# Patient Record
Sex: MALE | Race: OTHER | Employment: FULL TIME | URBAN - METROPOLITAN AREA
[De-identification: names, ages, dates, MRNs, and addresses within clinical notes are randomized per-mention and may not be internally consistent; named-entity substitution may affect disease eponyms.]

---

## 2017-06-25 ENCOUNTER — HOSPITAL ENCOUNTER (EMERGENCY)
Facility: HOSPITAL | Age: 42
Discharge: HOME | End: 2017-06-25
Attending: EMERGENCY MEDICINE
Payer: COMMERCIAL

## 2017-06-25 VITALS
SYSTOLIC BLOOD PRESSURE: 134 MMHG | OXYGEN SATURATION: 100 % | RESPIRATION RATE: 18 BRPM | DIASTOLIC BLOOD PRESSURE: 81 MMHG | BODY MASS INDEX: 28.93 KG/M2 | HEART RATE: 67 BPM | HEIGHT: 66 IN | WEIGHT: 180 LBS | TEMPERATURE: 98 F

## 2017-06-25 DIAGNOSIS — T14.8XXA MUSCLE STRAIN: Primary | ICD-10-CM

## 2017-06-25 PROCEDURE — 99283 EMERGENCY DEPT VISIT LOW MDM: CPT

## 2017-06-25 RX ORDER — METHOCARBAMOL 750 MG/1
750 TABLET, FILM COATED ORAL 4 TIMES DAILY
Qty: 40 TABLET | Refills: 0 | Status: SHIPPED | OUTPATIENT
Start: 2017-06-25 | End: 2017-07-05
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
## 2017-06-29 ENCOUNTER — OFFICE VISIT (OUTPATIENT)
Dept: INTERNAL MEDICINE CLINIC | Facility: CLINIC | Age: 42
End: 2017-06-29

## 2017-06-29 VITALS
TEMPERATURE: 99 F | WEIGHT: 168.38 LBS | BODY MASS INDEX: 27 KG/M2 | DIASTOLIC BLOOD PRESSURE: 84 MMHG | HEART RATE: 72 BPM | SYSTOLIC BLOOD PRESSURE: 132 MMHG

## 2017-06-29 DIAGNOSIS — S13.9XXA CERVICAL SPRAIN, INITIAL ENCOUNTER: Primary | ICD-10-CM

## 2017-06-29 DIAGNOSIS — S43.402A SPRAIN OF LEFT SHOULDER, UNSPECIFIED SHOULDER SPRAIN TYPE, INITIAL ENCOUNTER: ICD-10-CM

## 2017-06-29 DIAGNOSIS — M54.6 ACUTE MIDLINE THORACIC BACK PAIN: ICD-10-CM

## 2017-06-29 PROCEDURE — 99212 OFFICE O/P EST SF 10 MIN: CPT | Performed by: INTERNAL MEDICINE

## 2017-06-29 PROCEDURE — 99214 OFFICE O/P EST MOD 30 MIN: CPT | Performed by: INTERNAL MEDICINE

## 2017-06-29 RX ORDER — MELOXICAM 15 MG/1
15 TABLET ORAL DAILY
Qty: 10 TABLET | Refills: 1 | Status: SHIPPED | OUTPATIENT
Start: 2017-06-29 | End: 2019-09-11 | Stop reason: ALTCHOICE
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
## 2017-11-10 ENCOUNTER — OFFICE VISIT (OUTPATIENT)
Dept: INTERNAL MEDICINE CLINIC | Facility: CLINIC | Age: 42
End: 2017-11-10

## 2017-11-10 VITALS
SYSTOLIC BLOOD PRESSURE: 110 MMHG | HEART RATE: 83 BPM | DIASTOLIC BLOOD PRESSURE: 72 MMHG | WEIGHT: 186 LBS | HEIGHT: 66 IN | BODY MASS INDEX: 29.89 KG/M2

## 2017-11-10 DIAGNOSIS — E78.5 HYPERLIPIDEMIA, UNSPECIFIED HYPERLIPIDEMIA TYPE: ICD-10-CM

## 2017-11-10 DIAGNOSIS — E03.8 TSH (THYROID-STIMULATING HORMONE DEFICIENCY): ICD-10-CM

## 2017-11-10 DIAGNOSIS — E55.9 VITAMIN D DEFICIENCY: ICD-10-CM

## 2017-11-10 DIAGNOSIS — Z00.00 ROUTINE GENERAL MEDICAL EXAMINATION AT A HEALTH CARE FACILITY: Primary | ICD-10-CM

## 2017-11-10 DIAGNOSIS — M54.2 NECK PAIN: ICD-10-CM

## 2017-11-10 PROCEDURE — 99396 PREV VISIT EST AGE 40-64: CPT | Performed by: INTERNAL MEDICINE

## 2017-11-10 RX ORDER — HYDROCODONE BITARTRATE AND ACETAMINOPHEN 5; 325 MG/1; MG/1
1 TABLET ORAL EVERY 4 HOURS PRN
COMMUNITY
Start: 2017-09-06 | End: 2019-09-11 | Stop reason: ALTCHOICE

## 2017-11-10 NOTE — PROGRESS NOTES
HPI:    Patient ID: Fawn Ariza is a 43year old male. HPI   Annual Preventative Exam  Patient  arrives today for annual preventative physical examination. The patient complains of no new problems and has 0/10 pain.          Review of Systems   Constituti rales. He exhibits no tenderness. Abdominal: Soft. Bowel sounds are normal. He exhibits no distension and no mass. There is no tenderness. There is no rebound and no guarding. Musculoskeletal: Normal range of motion.    Neurological: He is alert and holley Placed This Encounter      Vitamin D, 25-Hydroxy [E]      Lipid Panel [E]      TSH W REFLEX TO FREE T4 [06209][Q]    Meds This Visit:    No prescriptions requested or ordered in this encounter       Imaging & Referrals:  None       ID#[id number]  By signing my n

## 2017-12-15 ENCOUNTER — OFFICE VISIT (OUTPATIENT)
Dept: NEUROLOGY | Facility: CLINIC | Age: 42
End: 2017-12-15

## 2017-12-15 VITALS
DIASTOLIC BLOOD PRESSURE: 66 MMHG | SYSTOLIC BLOOD PRESSURE: 108 MMHG | BODY MASS INDEX: 29.73 KG/M2 | HEIGHT: 66 IN | OXYGEN SATURATION: 16 % | HEART RATE: 60 BPM | WEIGHT: 185 LBS

## 2017-12-15 DIAGNOSIS — K14.6 BURNING MOUTH SYNDROME: Primary | ICD-10-CM

## 2017-12-15 PROCEDURE — 99203 OFFICE O/P NEW LOW 30 MIN: CPT | Performed by: OTHER

## 2017-12-15 RX ORDER — GABAPENTIN 100 MG/1
CAPSULE ORAL
Qty: 270 CAPSULE | Refills: 1 | Status: SHIPPED | OUTPATIENT
Start: 2017-12-15 | End: 2018-02-16

## 2017-12-15 NOTE — PROGRESS NOTES
Neurology Initial Visit     Referred By: Dr. Alarcon ref. provider found    Chief Complaint: Patient presents with:  Neurologic Problem: Patient presents today c/o burning pain on bottom gums which started aftger he had oral surgery on 9/6.  He states that he tablet by mouth every 4 (four) hours as needed. , Disp: , Rfl:   •  Meloxicam 15 MG Oral Tab, Take 1 tablet (15 mg total) by mouth daily.  (Patient taking differently: Take 15 mg by mouth as needed.  ), Disp: 10 tablet, Rfl: 1    No Known Allergies    ROS: intact  Rapid alternating movements intact    Gait:  Normal posture  Normal physiologic      Labs:      Lab Results  Component Value Date   TSH 2.68 08/13/2016       Lab Results  Component Value Date   HDL 30 08/13/2016    (H) 08/13/2016   TRIG 108

## 2017-12-20 ENCOUNTER — APPOINTMENT (OUTPATIENT)
Dept: LAB | Age: 42
End: 2017-12-20
Attending: Other
Payer: COMMERCIAL

## 2017-12-20 DIAGNOSIS — E55.9 VITAMIN D DEFICIENCY: ICD-10-CM

## 2017-12-20 DIAGNOSIS — E78.5 HYPERLIPIDEMIA, UNSPECIFIED HYPERLIPIDEMIA TYPE: ICD-10-CM

## 2017-12-20 DIAGNOSIS — K14.6 BURNING MOUTH SYNDROME: ICD-10-CM

## 2017-12-20 PROCEDURE — 82607 VITAMIN B-12: CPT | Performed by: INTERNAL MEDICINE

## 2017-12-20 PROCEDURE — 82306 VITAMIN D 25 HYDROXY: CPT

## 2017-12-20 PROCEDURE — 82746 ASSAY OF FOLIC ACID SERUM: CPT

## 2017-12-20 PROCEDURE — 84443 ASSAY THYROID STIM HORMONE: CPT | Performed by: INTERNAL MEDICINE

## 2017-12-20 PROCEDURE — 82525 ASSAY OF COPPER: CPT

## 2017-12-20 PROCEDURE — 84630 ASSAY OF ZINC: CPT

## 2017-12-20 PROCEDURE — 86038 ANTINUCLEAR ANTIBODIES: CPT

## 2017-12-20 PROCEDURE — 36415 COLL VENOUS BLD VENIPUNCTURE: CPT

## 2017-12-20 PROCEDURE — 80061 LIPID PANEL: CPT

## 2017-12-20 PROCEDURE — 82390 ASSAY OF CERULOPLASMIN: CPT

## 2017-12-22 ENCOUNTER — TELEPHONE (OUTPATIENT)
Dept: NEUROLOGY | Facility: CLINIC | Age: 42
End: 2017-12-22

## 2017-12-22 NOTE — TELEPHONE ENCOUNTER
----- Message from Ajit Christina MD sent at 12/22/2017  1:11 PM CST -----  Please let the patient know that results of the tests so far were normal.    Thank you    Message left on patient's home and cell phones, asking him to call back.  We need to

## 2017-12-22 NOTE — TELEPHONE ENCOUNTER
Patient was informed of Dr. Mamta Wall message (from 12/22/17) that the results of the tests so far were normal. Patient voiced understanding, and had no questions at this time.

## 2018-02-16 NOTE — TELEPHONE ENCOUNTER
Spoke to patient. He states that he is taking gabapentin 100 mg, 3 po TID but insurance will only cover 6 caps per day. He is requesting a refill with gabapentin 300 mg capsules.      Refill request for gabapentin 300 mg, TID, #90, 1 refills

## 2018-02-17 RX ORDER — GABAPENTIN 300 MG/1
300 CAPSULE ORAL 3 TIMES DAILY
Qty: 90 CAPSULE | Refills: 1 | Status: SHIPPED | OUTPATIENT
Start: 2018-02-17 | End: 2019-09-11 | Stop reason: ALTCHOICE

## 2019-04-12 ENCOUNTER — TELEPHONE (OUTPATIENT)
Dept: OPHTHALMOLOGY | Facility: CLINIC | Age: 44
End: 2019-04-12

## 2019-04-12 NOTE — TELEPHONE ENCOUNTER
Patient complains of a chalazion on his LLL x 3-4 months which is gradually getting worse. He has tried W/C, but no improvement.   He has history of a chalazion excision with [facility] in the past.  I booked him with [facility] on 4/13/19 for evaluation only of chalazio

## 2019-04-13 ENCOUNTER — OFFICE VISIT (OUTPATIENT)
Dept: OPHTHALMOLOGY | Facility: CLINIC | Age: 44
End: 2019-04-13
Payer: COMMERCIAL

## 2019-04-13 DIAGNOSIS — H02.889 MGD (MEIBOMIAN GLAND DYSFUNCTION): Primary | ICD-10-CM

## 2019-04-13 DIAGNOSIS — H00.15 CHALAZION OF LEFT LOWER EYELID: ICD-10-CM

## 2019-04-13 PROCEDURE — 99203 OFFICE O/P NEW LOW 30 MIN: CPT | Performed by: OPHTHALMOLOGY

## 2019-04-13 PROCEDURE — 99212 OFFICE O/P EST SF 10 MIN: CPT | Performed by: OPHTHALMOLOGY

## 2019-04-13 NOTE — ASSESSMENT & PLAN NOTE
Told patient that since this has been there for some time, it most likely will not resolve on its own. Excision scheduled for patient to come back.

## 2019-04-13 NOTE — PATIENT INSTRUCTIONS
MGD (meibomian gland dysfunction)  Patient was instructed to use warm compresses to the eyelids twice a day everyday. Instructions for warm compress use:   Patient should place wash compresses on both eyelids for 5 minutes every morning and every night.

## 2019-04-13 NOTE — PROGRESS NOTES
Lady Cheung is a 37year old male. HPI:     HPI     Patient is here for an office visit. He is complaining of a bump on his LLL for 3-4 months. Patient states he is using warm compresses once a day and sometimes every other day.       Last edited by [place] [employer]                 ASSESSMENT/PLAN:     Diagnoses and Plan:     MGD (meibomian gland dysfunction)  Patient was instructed to use warm compresses to the eyelids twice a day everyday.     Instructions for warm compress use:   Patient should place wash co

## 2019-04-18 ENCOUNTER — OFFICE VISIT (OUTPATIENT)
Dept: OPHTHALMOLOGY | Facility: CLINIC | Age: 44
End: 2019-04-18
Payer: COMMERCIAL

## 2019-04-18 DIAGNOSIS — H11.222 PYOGENIC GRANULOMA OF LEFT CONJUNCTIVA: Primary | ICD-10-CM

## 2019-04-18 PROBLEM — H00.15 CHALAZION OF LEFT LOWER EYELID: Status: RESOLVED | Noted: 2019-04-13 | Resolved: 2019-04-18

## 2019-04-18 PROCEDURE — 68110 EXC LES CONJUNCTIVA <1 CM: CPT | Performed by: OPHTHALMOLOGY

## 2019-04-18 NOTE — PATIENT INSTRUCTIONS
Pyogenic granuloma of left conjunctiva  Excision of left pyogenic granuloma was done in the office today by Dr. Sandra Mackenzie without complications. Erythromycin was applied and pressure patch was applied to the left eye.    Patient can remove the pressure patch

## 2019-06-11 ENCOUNTER — TELEPHONE (OUTPATIENT)
Dept: NEUROLOGY | Facility: CLINIC | Age: 44
End: 2019-06-11

## 2019-09-11 ENCOUNTER — OFFICE VISIT (OUTPATIENT)
Dept: INTERNAL MEDICINE CLINIC | Facility: CLINIC | Age: 44
End: 2019-09-11
Payer: COMMERCIAL

## 2019-09-11 VITALS
TEMPERATURE: 99 F | SYSTOLIC BLOOD PRESSURE: 126 MMHG | DIASTOLIC BLOOD PRESSURE: 72 MMHG | HEIGHT: 66 IN | HEART RATE: 68 BPM | BODY MASS INDEX: 30.86 KG/M2 | RESPIRATION RATE: 20 BRPM | WEIGHT: 192 LBS

## 2019-09-11 DIAGNOSIS — Z28.21 INFLUENZA VACCINATION DECLINED: ICD-10-CM

## 2019-09-11 DIAGNOSIS — Z00.00 ANNUAL PHYSICAL EXAM: Primary | ICD-10-CM

## 2019-09-11 DIAGNOSIS — E55.9 VITAMIN D DEFICIENCY: ICD-10-CM

## 2019-09-11 DIAGNOSIS — R06.83 SNORING: ICD-10-CM

## 2019-09-11 DIAGNOSIS — Z28.21 TETANUS, DIPHTHERIA, AND ACELLULAR PERTUSSIS (TDAP) VACCINATION DECLINED: ICD-10-CM

## 2019-09-11 PROCEDURE — 99396 PREV VISIT EST AGE 40-64: CPT | Performed by: INTERNAL MEDICINE

## 2019-09-11 NOTE — PROGRESS NOTES
HPI:    Patient ID: Prince Olivarez is a 40year old male. Chief Complaint: Physical (Annual)      HPI  Pleasant 43y old gentleman who presents for annual physical exam.   Prior physician care:  Complaints: none today   Work in office setting.    Exercise inter Psychiatric/Behavioral: Negative for sleep disturbance. Medical History:     Reviewed:  No current outpatient medications on file.      Reviewed:  Patient Active Problem List    Influenza vaccination declined         Date Noted: 09/12/2019      Dayanara GLOBULIN 2.6 08/13/2016    AGRATIO 1.6 08/13/2016     08/13/2016    K 4.4 08/13/2016     08/13/2016    CO2 27 08/13/2016     Lab Results   Component Value Date    WBC 5.7 08/13/2016    RBC 5.14 08/13/2016    HGB 15.0 08/13/2016    HCT 44.3 08/ Eyes: Pupils are equal, round, and reactive to light. Conjunctivae and EOM are normal. Right eye exhibits no discharge. Left eye exhibits no discharge. No scleral icterus. Neck: Normal range of motion. Neck supple.    Cardiovascular: Normal rate, regular - Eat a low fat, low cholesterol diet- fish, poultry, legumes, eggs, baked chicken breast, turkey, nuts, whole grains, low fat live culture yogurt.   - Limit intake of fried/fast foods, red meats, high sodium canned/microwaved foods, sweets/sugary drinks, u Hemoglobin A1C      Comp Metabolic Panel (14)      Lipid Panel      INFLUENZA VACCINE, TRI, 0.5 ML      TETANUS, DIPHTHERIA TOXOIDS AND ACELLULAR PERTUSIS VACCINE (TDAP), >7 YEARS, IM USE        Fabio Dobson MD  Internal Medicine      ---------------- Exercise can help you lose weight, tone your muscles, and make your lungs work better. These changes may help improve your snoring. Ask your healthcare provider about an exercise program like walking, or something else that you enjoy.   Unblock your nose  I

## 2019-09-11 NOTE — PATIENT INSTRUCTIONS
Tips to Help Prevent Snoring    Your snoring may get better if you make a few simple changes in your sleeping and waking habits.  These changes might be all you need to improve or even cure your snoring, or they may work best when used along with other ty If something blocks your nose, treating the problem may help improve snoring. Your healthcare provider can suggest medicines for allergies or sinus problems. Nasal saline rinses can also open up the nasal passages.  Nasal strips applied on the bridge of the

## 2019-09-12 PROBLEM — Z28.21 INFLUENZA VACCINATION DECLINED: Status: ACTIVE | Noted: 2019-09-12

## 2019-09-12 PROBLEM — Z28.21 TETANUS, DIPHTHERIA, AND ACELLULAR PERTUSSIS (TDAP) VACCINATION DECLINED: Status: ACTIVE | Noted: 2019-09-12

## 2019-09-13 ENCOUNTER — APPOINTMENT (OUTPATIENT)
Dept: LAB | Age: 44
End: 2019-09-13
Attending: INTERNAL MEDICINE
Payer: COMMERCIAL

## 2019-09-13 LAB
25(OH)D3 SERPL-MCNC: 70.7 NG/ML (ref 30–100)
ALBUMIN SERPL-MCNC: 4.2 G/DL (ref 3.4–5)
ALBUMIN/GLOB SERPL: 1.2 {RATIO} (ref 1–2)
ALP LIVER SERPL-CCNC: 68 U/L (ref 45–117)
ALT SERPL-CCNC: 40 U/L (ref 16–61)
ANION GAP SERPL CALC-SCNC: 10 MMOL/L (ref 0–18)
AST SERPL-CCNC: 20 U/L (ref 15–37)
BILIRUB SERPL-MCNC: 0.8 MG/DL (ref 0.1–2)
BUN BLD-MCNC: 16 MG/DL (ref 7–18)
BUN/CREAT SERPL: 14.2 (ref 10–20)
CALCIUM BLD-MCNC: 9.4 MG/DL (ref 8.5–10.1)
CHLORIDE SERPL-SCNC: 107 MMOL/L (ref 98–112)
CHOLEST SMN-MCNC: 237 MG/DL (ref ?–200)
CO2 SERPL-SCNC: 25 MMOL/L (ref 21–32)
CREAT BLD-MCNC: 1.13 MG/DL (ref 0.7–1.3)
DEPRECATED RDW RBC AUTO: 37.8 FL (ref 35.1–46.3)
ERYTHROCYTE [DISTWIDTH] IN BLOOD BY AUTOMATED COUNT: 12.1 % (ref 11–15)
EST. AVERAGE GLUCOSE BLD GHB EST-MCNC: 111 MG/DL (ref 68–126)
GLOBULIN PLAS-MCNC: 3.5 G/DL (ref 2.8–4.4)
GLUCOSE BLD-MCNC: 97 MG/DL (ref 70–99)
HBA1C MFR BLD HPLC: 5.5 % (ref ?–5.7)
HCT VFR BLD AUTO: 45.3 % (ref 39–53)
HDLC SERPL-MCNC: 39 MG/DL (ref 40–59)
HGB BLD-MCNC: 14.9 G/DL (ref 13–17.5)
LDLC SERPL CALC-MCNC: 175 MG/DL (ref ?–100)
M PROTEIN MFR SERPL ELPH: 7.7 G/DL (ref 6.4–8.2)
MCH RBC QN AUTO: 28.2 PG (ref 26–34)
MCHC RBC AUTO-ENTMCNC: 32.9 G/DL (ref 31–37)
MCV RBC AUTO: 85.6 FL (ref 80–100)
NONHDLC SERPL-MCNC: 198 MG/DL (ref ?–130)
OSMOLALITY SERPL CALC.SUM OF ELEC: 295 MOSM/KG (ref 275–295)
PATIENT FASTING: YES
PATIENT FASTING: YES
PLATELET # BLD AUTO: 168 10(3)UL (ref 150–450)
POTASSIUM SERPL-SCNC: 3.9 MMOL/L (ref 3.5–5.1)
RBC # BLD AUTO: 5.29 X10(6)UL (ref 4.3–5.7)
SODIUM SERPL-SCNC: 142 MMOL/L (ref 136–145)
T4 FREE SERPL-MCNC: 1.1 NG/DL (ref 0.8–1.7)
TRIGL SERPL-MCNC: 116 MG/DL (ref 30–149)
TSI SER-ACNC: 3.76 MIU/ML (ref 0.36–3.74)
VLDLC SERPL CALC-MCNC: 23 MG/DL (ref 0–30)
WBC # BLD AUTO: 7.6 X10(3) UL (ref 4–11)

## 2019-09-13 PROCEDURE — 84439 ASSAY OF FREE THYROXINE: CPT | Performed by: INTERNAL MEDICINE

## 2019-09-13 PROCEDURE — 83036 HEMOGLOBIN GLYCOSYLATED A1C: CPT | Performed by: INTERNAL MEDICINE

## 2019-09-13 PROCEDURE — 84443 ASSAY THYROID STIM HORMONE: CPT | Performed by: INTERNAL MEDICINE

## 2019-09-13 PROCEDURE — 82306 VITAMIN D 25 HYDROXY: CPT | Performed by: INTERNAL MEDICINE

## 2019-09-13 PROCEDURE — 80053 COMPREHEN METABOLIC PANEL: CPT | Performed by: INTERNAL MEDICINE

## 2019-09-13 PROCEDURE — 80061 LIPID PANEL: CPT | Performed by: INTERNAL MEDICINE

## 2019-09-13 PROCEDURE — 85027 COMPLETE CBC AUTOMATED: CPT | Performed by: INTERNAL MEDICINE

## 2019-09-13 PROCEDURE — 36415 COLL VENOUS BLD VENIPUNCTURE: CPT | Performed by: INTERNAL MEDICINE

## 2019-09-16 DIAGNOSIS — E78.5 DYSLIPIDEMIA: Primary | ICD-10-CM

## 2019-12-03 ENCOUNTER — OFFICE VISIT (OUTPATIENT)
Dept: NEUROLOGY | Facility: CLINIC | Age: 44
End: 2019-12-03
Payer: COMMERCIAL

## 2019-12-03 VITALS
SYSTOLIC BLOOD PRESSURE: 100 MMHG | HEIGHT: 66 IN | RESPIRATION RATE: 20 BRPM | DIASTOLIC BLOOD PRESSURE: 70 MMHG | WEIGHT: 185 LBS | BODY MASS INDEX: 29.73 KG/M2 | HEART RATE: 56 BPM

## 2019-12-03 DIAGNOSIS — M54.12 CERVICAL RADICULOPATHY: Primary | ICD-10-CM

## 2019-12-03 PROCEDURE — 99214 OFFICE O/P EST MOD 30 MIN: CPT | Performed by: OTHER

## 2019-12-03 RX ORDER — GABAPENTIN 300 MG/1
CAPSULE ORAL
Qty: 270 CAPSULE | Refills: 3 | Status: SHIPPED | OUTPATIENT
Start: 2019-12-03

## 2019-12-03 NOTE — PROGRESS NOTES
Neurology follow up Visit     Referred By: Dr. Alarcon ref. provider found    Chief Complaint: Patient presents with:  Neck Pain: Patient presents for neck pain c/o tingling in fingers and weakness in left arm.  Patient states that he has had neck problem sinc back to our clinic in December 2019. He remembers that gabapentin is the prescription for the his gum burning actually was helpful for his neck pain back in 2017 but he has not been taking it recently.      Past Medical History:   Diagnosis Date   • Tan papilledema or retinal hemorrhages. Normal optic discs, sharp edges. III, IV, VI- EOM intact, WATSON  V. Facial sensation intact  VII. Face symmetric, no facial weakness  VIII. Hearing intact to whisper, tuning fork or finger rub. IX.  Pallet elevates symm or EMG as well and maybe even referral to physiatry       Education and counseling provided to patient. Instructed patient to call my office or seek medical attention immediately if symptoms worsen. Patient verbalized understanding of information given.  A

## 2020-05-31 DIAGNOSIS — E78.5 DYSLIPIDEMIA: ICD-10-CM

## 2020-06-01 RX ORDER — ROSUVASTATIN CALCIUM 5 MG/1
TABLET, COATED ORAL
Qty: 90 TABLET | Refills: 1 | Status: SHIPPED | OUTPATIENT
Start: 2020-06-01 | End: 2020-08-06

## 2020-08-06 ENCOUNTER — NURSE TRIAGE (OUTPATIENT)
Dept: INTERNAL MEDICINE CLINIC | Facility: CLINIC | Age: 45
End: 2020-08-06

## 2020-08-06 ENCOUNTER — OFFICE VISIT (OUTPATIENT)
Dept: FAMILY MEDICINE CLINIC | Facility: CLINIC | Age: 45
End: 2020-08-06
Payer: COMMERCIAL

## 2020-08-06 VITALS
DIASTOLIC BLOOD PRESSURE: 74 MMHG | BODY MASS INDEX: 29.09 KG/M2 | SYSTOLIC BLOOD PRESSURE: 119 MMHG | HEART RATE: 74 BPM | WEIGHT: 181 LBS | HEIGHT: 66 IN

## 2020-08-06 DIAGNOSIS — M54.42 CHRONIC MIDLINE LOW BACK PAIN WITH LEFT-SIDED SCIATICA: Primary | ICD-10-CM

## 2020-08-06 DIAGNOSIS — G89.29 CHRONIC MIDLINE LOW BACK PAIN WITH LEFT-SIDED SCIATICA: Primary | ICD-10-CM

## 2020-08-06 PROCEDURE — 96372 THER/PROPH/DIAG INJ SC/IM: CPT | Performed by: FAMILY MEDICINE

## 2020-08-06 PROCEDURE — 99203 OFFICE O/P NEW LOW 30 MIN: CPT | Performed by: FAMILY MEDICINE

## 2020-08-06 PROCEDURE — 3078F DIAST BP <80 MM HG: CPT | Performed by: FAMILY MEDICINE

## 2020-08-06 PROCEDURE — 3008F BODY MASS INDEX DOCD: CPT | Performed by: FAMILY MEDICINE

## 2020-08-06 PROCEDURE — 3074F SYST BP LT 130 MM HG: CPT | Performed by: FAMILY MEDICINE

## 2020-08-06 RX ORDER — METHYLPREDNISOLONE 4 MG/1
TABLET ORAL
Qty: 1 KIT | Refills: 0 | Status: SHIPPED | OUTPATIENT
Start: 2020-08-06

## 2020-08-06 RX ORDER — KETOROLAC TROMETHAMINE 30 MG/ML
60 INJECTION, SOLUTION INTRAMUSCULAR; INTRAVENOUS ONCE
Status: DISCONTINUED | OUTPATIENT
Start: 2020-08-06 | End: 2020-08-06

## 2020-08-06 RX ORDER — KETOROLAC TROMETHAMINE 30 MG/ML
30 INJECTION, SOLUTION INTRAMUSCULAR; INTRAVENOUS ONCE
Status: COMPLETED | OUTPATIENT
Start: 2020-08-06 | End: 2020-08-06

## 2020-08-06 RX ADMIN — KETOROLAC TROMETHAMINE 60 MG: 30 INJECTION, SOLUTION INTRAMUSCULAR; INTRAVENOUS at 14:53:00

## 2020-08-06 NOTE — PROGRESS NOTES
CHIEF COMPLAINT:     Patient presents with:  Back Pain: chronic back pain. HPI:   Carolina Wong is a 39year old male who is here for complaints of back pain. Pain is located at low back. Pain is described as sharp, shooting. Severity is severe.  The BACK: lumbosacral tenderness, DTR's are 2+ bilaterally, strength is 5+/5, sensation is intact, strength is decreased on the RLE, sensation is decreased on the RLE  NEURO:  DTR's intact and equal bilaterally. Sensation intact.     ASSESSMENT:   Jennifer Grubbs i · Sciatic nerve. This is a large nerve formed from several nerve roots in the low back. This nerve extends down the back of the leg to the foot. With lumbar radiculopathy, nerve roots in the low back become irritated. This leads to pain and symptoms.  The · Weight-loss program. If you are overweight, losing extra pounds (kilograms) may help relieve symptoms. In some cases, you may need surgery to fix the underlying problem. This depends on the cause, the symptoms, and how long the pain has lasted.   Possibl

## 2020-08-06 NOTE — TELEPHONE ENCOUNTER
Action Requested: Summary for Provider     []  Critical Lab, Recommendations Needed  [] Need Additional Advice  [x]   FYI    []   Need Orders  [] Need Medications Sent to Pharmacy  []  Other     SUMMARY: Patient calling with complaint of bilateral lower ba

## 2020-08-06 NOTE — PATIENT INSTRUCTIONS
Understanding Lumbar Radiculopathy    Lumbar radiculopathy is irritation or inflammation of a nerve root in the low back. It causes symptoms that spread out from the back down one or both legs.  To understand this condition, it helps to understand the par Symptoms of lumbar radiculopathy  These include:  · Pain in the low back  · Pain, numbness, tingling, or weakness that travels into the buttocks, hip, groin, or leg  · Muscle spasms in the low back, or leg  Treatment for lumbar radiculopathy  In most cases

## 2021-02-12 ENCOUNTER — NURSE TRIAGE (OUTPATIENT)
Dept: INTERNAL MEDICINE CLINIC | Facility: CLINIC | Age: 46
End: 2021-02-12

## 2021-02-12 ENCOUNTER — TELEPHONE (OUTPATIENT)
Dept: INTERNAL MEDICINE CLINIC | Facility: CLINIC | Age: 46
End: 2021-02-12

## 2021-02-12 NOTE — TELEPHONE ENCOUNTER
Action Requested: Summary for Provider     []  Critical Lab, Recommendations Needed  [x] Need Additional Advice  []   FYI    []   Need Orders  [] Need Medications Sent to Pharmacy  []  Other     SUMMARY: Swollen scrotum for two weeks, painful urination; ne

## 2021-02-12 NOTE — TELEPHONE ENCOUNTER
Called the patient, informed him of Dr. Mason Gains recommendation to be [facility] for [facility] treatment and evaluation. Patient stated he feels fine. Provided patient with closest location of IC([place]).

## 2021-02-12 NOTE — TELEPHONE ENCOUNTER
I recommend patient to be seen sooner by the Doctor    [facility] be seen in [facility] today  for further treatment and evaluation

## 2021-02-12 NOTE — TELEPHONE ENCOUNTER
Patient reports groin pain.     Appointment For: Carolina Wong ([id number])   Visit Type: [address] [place], [place] [address]y 6 ([id number])      2/17/2021    4:20 PM  20 mins. Cherrie Warren MD         [facility]      Patient Comments:   swelling around scrotum , groin pain ,

## 2021-02-12 NOTE — TELEPHONE ENCOUNTER
Called pt to reschedule appt. No answer. LMTCB  Please assist patient with rescheduling appt.  MD unavailable until Monday 2/15

## 2021-02-14 NOTE — TELEPHONE ENCOUNTER
Pt does did not present to [address] [place] [address] though he is aware that is the nurse and doctor's advice. He does have an office visit with Dr. Davonna Schirmer scheduled on 2/17/21.

## 2021-02-14 NOTE — TELEPHONE ENCOUNTER
Agreed, should go to UC today or first thing Monday morning. Will need urgent US and urinalysis to evaluate for infection.

## 2021-02-17 ENCOUNTER — LAB ENCOUNTER (OUTPATIENT)
Dept: LAB | Facility: HOSPITAL | Age: 46
End: 2021-02-17
Attending: INTERNAL MEDICINE
Payer: COMMERCIAL

## 2021-02-17 ENCOUNTER — OFFICE VISIT (OUTPATIENT)
Dept: INTERNAL MEDICINE CLINIC | Facility: CLINIC | Age: 46
End: 2021-02-17
Payer: COMMERCIAL

## 2021-02-17 VITALS
HEART RATE: 85 BPM | DIASTOLIC BLOOD PRESSURE: 75 MMHG | TEMPERATURE: 98 F | SYSTOLIC BLOOD PRESSURE: 112 MMHG | HEIGHT: 66 IN | BODY MASS INDEX: 31.24 KG/M2 | WEIGHT: 194.38 LBS

## 2021-02-17 DIAGNOSIS — R30.0 DYSURIA: Primary | ICD-10-CM

## 2021-02-17 LAB
BILIRUB UR QL: NEGATIVE
CLARITY UR: CLEAR
COLOR UR: YELLOW
GLUCOSE UR-MCNC: NEGATIVE MG/DL
HGB UR QL STRIP.AUTO: NEGATIVE
KETONES UR-MCNC: NEGATIVE MG/DL
LEUKOCYTE ESTERASE UR QL STRIP.AUTO: NEGATIVE
NITRITE UR QL STRIP.AUTO: NEGATIVE
PH UR: 6 [PH] (ref 5–8)
PROT UR-MCNC: NEGATIVE MG/DL
SP GR UR STRIP: 1.01 (ref 1–1.03)
UROBILINOGEN UR STRIP-ACNC: <2

## 2021-02-17 PROCEDURE — 81003 URINALYSIS AUTO W/O SCOPE: CPT | Performed by: INTERNAL MEDICINE

## 2021-02-17 PROCEDURE — 3008F BODY MASS INDEX DOCD: CPT | Performed by: INTERNAL MEDICINE

## 2021-02-17 PROCEDURE — 3074F SYST BP LT 130 MM HG: CPT | Performed by: INTERNAL MEDICINE

## 2021-02-17 PROCEDURE — 3078F DIAST BP <80 MM HG: CPT | Performed by: INTERNAL MEDICINE

## 2021-02-17 PROCEDURE — 99213 OFFICE O/P EST LOW 20 MIN: CPT | Performed by: INTERNAL MEDICINE

## 2021-02-17 NOTE — PROGRESS NOTES
History of Present Illness   Patient ID: Sana Calixto is a 39year old male. Chief Complaint: Groin Pain (scrotum tenderness pain 5/10)      HPI   80-year-old gentleman who presents with a 5-day history of left groin pain, dysuria, pain with ejaculation.   6 time.   Psychiatric: He has a normal mood and affect. Assessment & Plan    1. Dysuria  - URINALYSIS WITH CULTURE REFLEX  - CHLAMYDIA/GONOCOCCUS, SHRUTHI  Plan  Start with above, further recs once results arrive.  Pt declined hepatitis/syhlis/HIV testing, Active Problems:  Patient Active Problem List    Influenza vaccination declined      Tetanus, diphtheria, and acellular pertussis (Tdap) vaccination declined      Pyogenic granuloma of left conjunctiva      Disease of nail      Chalazion right lower eyelid

## 2021-02-19 ENCOUNTER — LAB ENCOUNTER (OUTPATIENT)
Dept: LAB | Facility: HOSPITAL | Age: 46
End: 2021-02-19
Attending: INTERNAL MEDICINE
Payer: COMMERCIAL

## 2021-02-19 DIAGNOSIS — R30.0 DYSURIA: ICD-10-CM

## 2021-02-19 PROCEDURE — 87491 CHLMYD TRACH DNA AMP PROBE: CPT

## 2021-02-19 PROCEDURE — 87591 N.GONORRHOEAE DNA AMP PROB: CPT

## 2021-02-22 LAB
C TRACH DNA SPEC QL NAA+PROBE: NEGATIVE
N GONORRHOEA DNA SPEC QL NAA+PROBE: NEGATIVE

## 2022-02-12 ENCOUNTER — TELEMEDICINE (OUTPATIENT)
Dept: INTERNAL MEDICINE CLINIC | Facility: CLINIC | Age: 47
End: 2022-02-12
Payer: COMMERCIAL

## 2022-02-12 ENCOUNTER — HOSPITAL ENCOUNTER (OUTPATIENT)
Dept: GENERAL RADIOLOGY | Age: 47
Discharge: HOME | End: 2022-02-12
Attending: INTERNAL MEDICINE
Payer: COMMERCIAL

## 2022-02-12 DIAGNOSIS — Z11.1 SCREENING-PULMONARY TB: ICD-10-CM

## 2022-02-12 DIAGNOSIS — Z11.1 SCREENING-PULMONARY TB: Primary | ICD-10-CM

## 2022-02-12 DIAGNOSIS — Z13.228 SCREENING FOR ENDOCRINE, NUTRITIONAL, METABOLIC AND IMMUNITY DISORDER: ICD-10-CM

## 2022-02-12 DIAGNOSIS — Z12.11 COLON CANCER SCREENING: ICD-10-CM

## 2022-02-12 DIAGNOSIS — Z13.0 SCREENING FOR ENDOCRINE, NUTRITIONAL, METABOLIC AND IMMUNITY DISORDER: ICD-10-CM

## 2022-02-12 DIAGNOSIS — Z20.1 EXPOSURE TO TB: ICD-10-CM

## 2022-02-12 DIAGNOSIS — Z13.21 SCREENING FOR ENDOCRINE, NUTRITIONAL, METABOLIC AND IMMUNITY DISORDER: ICD-10-CM

## 2022-02-12 DIAGNOSIS — M79.674 PAIN OF TOE OF RIGHT FOOT: ICD-10-CM

## 2022-02-12 DIAGNOSIS — Z13.29 SCREENING FOR ENDOCRINE, NUTRITIONAL, METABOLIC AND IMMUNITY DISORDER: ICD-10-CM

## 2022-02-12 PROCEDURE — 71046 X-RAY EXAM CHEST 2 VIEWS: CPT | Performed by: INTERNAL MEDICINE

## 2022-02-12 PROCEDURE — 99214 OFFICE O/P EST MOD 30 MIN: CPT | Performed by: INTERNAL MEDICINE

## 2022-02-12 PROCEDURE — 73660 X-RAY EXAM OF TOE(S): CPT | Performed by: INTERNAL MEDICINE

## 2022-02-14 LAB
ALBUMIN/GLOBULIN RATIO: 1.7 (CALC) (ref 1–2.5)
ALBUMIN: 4.5 G/DL (ref 3.6–5.1)
ALKALINE PHOSPHATASE: 64 U/L (ref 36–130)
ALT: 43 U/L (ref 9–46)
AST: 23 U/L (ref 10–40)
BILIRUBIN, TOTAL: 0.5 MG/DL (ref 0.2–1.2)
BUN: 17 MG/DL (ref 7–25)
CALCIUM: 9.6 MG/DL (ref 8.6–10.3)
CARBON DIOXIDE: 30 MMOL/L (ref 20–32)
CHLORIDE: 103 MMOL/L (ref 98–110)
CHOL/HDLC RATIO: 4.9 (CALC)
CHOLESTEROL, TOTAL: 204 MG/DL
CREATININE: 0.93 MG/DL (ref 0.6–1.35)
EGFR IF AFRICN AM: 114 ML/MIN/1.73M2
EGFR IF NONAFRICN AM: 98 ML/MIN/1.73M2
GLOBULIN: 2.7 G/DL (CALC) (ref 1.9–3.7)
GLUCOSE: 96 MG/DL (ref 65–99)
HDL CHOLESTEROL: 42 MG/DL
HEMATOCRIT: 42.5 % (ref 38.5–50)
HEMOGLOBIN A1C: 5.3 % OF TOTAL HGB
HEMOGLOBIN: 14.2 G/DL (ref 13.2–17.1)
LDL-CHOLESTEROL: 143 MG/DL (CALC)
MCH: 28.6 PG (ref 27–33)
MCHC: 33.4 G/DL (ref 32–36)
MCV: 85.5 FL (ref 80–100)
MITOGEN-NIL: >10 IU/ML
MPV: 11.3 FL (ref 7.5–12.5)
NIL: 0.1 IU/ML
NON-HDL CHOLESTEROL: 162 MG/DL (CALC)
PLATELET COUNT: 192 THOUSAND/UL (ref 140–400)
POTASSIUM: 4.5 MMOL/L (ref 3.5–5.3)
PROTEIN, TOTAL: 7.2 G/DL (ref 6.1–8.1)
PSA, TOTAL: 0.25 NG/ML
QUANTIFERON(R)-TB GOLD PLUS, 1 TUBE: NEGATIVE
RDW: 11.8 % (ref 11–15)
RED BLOOD CELL COUNT: 4.97 MILLION/UL (ref 4.2–5.8)
SODIUM: 140 MMOL/L (ref 135–146)
TB1-NIL: 0.03 IU/ML
TB2-NIL: 0.05 IU/ML
TRIGLYCERIDES: 85 MG/DL
TSH W/REFLEX TO FT4: 2.05 MIU/L (ref 0.4–4.5)
URIC ACID: 6.3 MG/DL (ref 4–8)
WHITE BLOOD CELL COUNT: 7.7 THOUSAND/UL (ref 3.8–10.8)

## 2022-02-21 ENCOUNTER — OFFICE VISIT (OUTPATIENT)
Dept: PODIATRY CLINIC | Facility: CLINIC | Age: 47
End: 2022-02-21
Payer: COMMERCIAL

## 2022-02-21 DIAGNOSIS — M20.5X1 ACQUIRED HALLUX LIMITUS OF RIGHT FOOT: ICD-10-CM

## 2022-02-21 DIAGNOSIS — M19.071 ARTHRITIS OF FIRST METATARSOPHALANGEAL (MTP) JOINT OF RIGHT FOOT: Primary | ICD-10-CM

## 2022-02-21 DIAGNOSIS — M79.671 RIGHT FOOT PAIN: ICD-10-CM

## 2022-02-21 PROCEDURE — 99243 OFF/OP CNSLTJ NEW/EST LOW 30: CPT | Performed by: PODIATRIST

## 2022-02-21 PROCEDURE — 20600 DRAIN/INJ JOINT/BURSA W/O US: CPT | Performed by: PODIATRIST

## 2022-02-21 RX ORDER — TRIAMCINOLONE ACETONIDE 40 MG/ML
40 INJECTION, SUSPENSION INTRA-ARTICULAR; INTRAMUSCULAR ONCE
Status: COMPLETED | OUTPATIENT
Start: 2022-02-21 | End: 2022-02-21

## 2022-02-21 NOTE — PROGRESS NOTES
Per verbal order from Dr. Kiya Linares, draw up 1ml of 0.5% Marcaine and 1ml of Kenalog 40 for cortisone injection to left hallux

## 2022-08-29 ENCOUNTER — TELEPHONE (OUTPATIENT)
Dept: OPHTHALMOLOGY | Facility: CLINIC | Age: 47
End: 2022-08-29

## 2022-08-29 NOTE — TELEPHONE ENCOUNTER
Per pt his eye has been red for about 2 months and feels it may have been scratched. Per pt asking for an appointment sooner than next year.  Please advise
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
## 2022-09-01 ENCOUNTER — OFFICE VISIT (OUTPATIENT)
Dept: OPHTHALMOLOGY | Facility: CLINIC | Age: 47
End: 2022-09-01
Payer: COMMERCIAL

## 2022-09-01 DIAGNOSIS — H02.886 MEIBOMIAN GLAND DYSFUNCTION (MGD) OF BOTH EYES: ICD-10-CM

## 2022-09-01 DIAGNOSIS — H02.883 MEIBOMIAN GLAND DYSFUNCTION (MGD) OF BOTH EYES: ICD-10-CM

## 2022-09-01 DIAGNOSIS — H10.44: Primary | ICD-10-CM

## 2022-09-01 PROCEDURE — 99203 OFFICE O/P NEW LOW 30 MIN: CPT | Performed by: OPHTHALMOLOGY

## 2022-09-01 RX ORDER — PREDNISOLONE ACETATE 10 MG/ML
1 SUSPENSION/ DROPS OPHTHALMIC 4 TIMES DAILY
Qty: 5 ML | Refills: 0 | Status: SHIPPED | OUTPATIENT
Start: 2022-09-01 | End: 2022-09-08

## 2022-09-01 NOTE — ASSESSMENT & PLAN NOTE
Patient was instructed to use warm compresses to the eyelids twice a day everyday. Instructions for warm compress use:   Patient should place wash compresses on both eyelids for 5 minutes every morning and every night. After 5 minutes of holding the warm compresses on the eyelids, patient should gently rub the eyelashes and then rinse thoroughly with warm water.

## 2022-09-01 NOTE — ASSESSMENT & PLAN NOTE
Start Pred Forte one drop 4 times a day in the left eye only x 7 days and then discontinue.  ( call our office if symptoms don't resolve after 7 days)

## 2022-09-01 NOTE — PATIENT INSTRUCTIONS
Vernal conjunctivitis of left eye  Start Pred Forte one drop 4 times a day in the left eye only x 7 days and then discontinue. ( call our office if symptoms don't resolve after 7 days)     Meibomian gland dysfunction (MGD) of both eyes  Patient was instructed to use warm compresses to the eyelids twice a day everyday. Instructions for warm compress use:   Patient should place wash compresses on both eyelids for 5 minutes every morning and every night. After 5 minutes of holding the warm compresses on the eyelids, patient should gently rub the eyelashes and then rinse thoroughly with warm water.

## 2023-07-31 ENCOUNTER — OFFICE VISIT (OUTPATIENT)
Dept: INTERNAL MEDICINE CLINIC | Facility: CLINIC | Age: 48
End: 2023-07-31

## 2023-07-31 ENCOUNTER — LAB ENCOUNTER (OUTPATIENT)
Dept: LAB | Age: 48
End: 2023-07-31
Attending: INTERNAL MEDICINE
Payer: COMMERCIAL

## 2023-07-31 VITALS
WEIGHT: 188 LBS | SYSTOLIC BLOOD PRESSURE: 109 MMHG | BODY MASS INDEX: 30.22 KG/M2 | DIASTOLIC BLOOD PRESSURE: 72 MMHG | HEART RATE: 59 BPM | HEIGHT: 66 IN

## 2023-07-31 DIAGNOSIS — S43.431A DEGENERATIVE SUPERIOR LABRAL ANTERIOR-TO-POSTERIOR (SLAP) TEAR OF RIGHT SHOULDER: ICD-10-CM

## 2023-07-31 DIAGNOSIS — Z12.5 ENCOUNTER FOR SCREENING FOR MALIGNANT NEOPLASM OF PROSTATE: ICD-10-CM

## 2023-07-31 DIAGNOSIS — Z12.11 COLON CANCER SCREENING: ICD-10-CM

## 2023-07-31 DIAGNOSIS — Z13.6 SCREENING, ISCHEMIC HEART DISEASE: ICD-10-CM

## 2023-07-31 DIAGNOSIS — E55.9 VITAMIN D DEFICIENCY: ICD-10-CM

## 2023-07-31 DIAGNOSIS — Z00.00 ANNUAL PHYSICAL EXAM: Primary | ICD-10-CM

## 2023-07-31 PROBLEM — H02.883 MEIBOMIAN GLAND DYSFUNCTION (MGD) OF BOTH EYES: Status: RESOLVED | Noted: 2022-09-01 | Resolved: 2023-07-31

## 2023-07-31 PROBLEM — H11.222 PYOGENIC GRANULOMA OF LEFT CONJUNCTIVA: Status: RESOLVED | Noted: 2019-04-18 | Resolved: 2023-07-31

## 2023-07-31 PROBLEM — H02.886 MEIBOMIAN GLAND DYSFUNCTION (MGD) OF BOTH EYES: Status: RESOLVED | Noted: 2022-09-01 | Resolved: 2023-07-31

## 2023-07-31 PROBLEM — Z28.21 TETANUS, DIPHTHERIA, AND ACELLULAR PERTUSSIS (TDAP) VACCINATION DECLINED: Status: RESOLVED | Noted: 2019-09-12 | Resolved: 2023-07-31

## 2023-07-31 PROBLEM — Z28.21 INFLUENZA VACCINATION DECLINED: Status: RESOLVED | Noted: 2019-09-12 | Resolved: 2023-07-31

## 2023-07-31 PROBLEM — H10.44 VERNAL CONJUNCTIVITIS OF LEFT EYE: Status: RESOLVED | Noted: 2022-09-01 | Resolved: 2023-07-31

## 2023-07-31 LAB
ALBUMIN SERPL-MCNC: 4.1 G/DL (ref 3.4–5)
ALBUMIN/GLOB SERPL: 1.2 {RATIO} (ref 1–2)
ALP LIVER SERPL-CCNC: 70 U/L
ALT SERPL-CCNC: 38 U/L
ANION GAP SERPL CALC-SCNC: 4 MMOL/L (ref 0–18)
AST SERPL-CCNC: 22 U/L (ref 15–37)
BILIRUB SERPL-MCNC: 0.4 MG/DL (ref 0.1–2)
BUN BLD-MCNC: 16 MG/DL (ref 7–18)
CALCIUM BLD-MCNC: 9.1 MG/DL (ref 8.5–10.1)
CHLORIDE SERPL-SCNC: 108 MMOL/L (ref 98–112)
CHOLEST SERPL-MCNC: 191 MG/DL (ref ?–200)
CO2 SERPL-SCNC: 28 MMOL/L (ref 21–32)
COMPLEXED PSA SERPL-MCNC: 0.31 NG/ML (ref ?–4)
CREAT BLD-MCNC: 1.33 MG/DL
EGFRCR SERPLBLD CKD-EPI 2021: 66 ML/MIN/1.73M2 (ref 60–?)
ERYTHROCYTE [DISTWIDTH] IN BLOOD BY AUTOMATED COUNT: 12.2 %
EST. AVERAGE GLUCOSE BLD GHB EST-MCNC: 114 MG/DL (ref 68–126)
FASTING PATIENT LIPID ANSWER: NO
FASTING STATUS PATIENT QL REPORTED: NO
GLOBULIN PLAS-MCNC: 3.5 G/DL (ref 2.8–4.4)
GLUCOSE BLD-MCNC: 89 MG/DL (ref 70–99)
HBA1C MFR BLD: 5.6 % (ref ?–5.7)
HCT VFR BLD AUTO: 44.2 %
HDLC SERPL-MCNC: 37 MG/DL (ref 40–59)
HGB BLD-MCNC: 14.2 G/DL
LDLC SERPL CALC-MCNC: 137 MG/DL (ref ?–100)
MCH RBC QN AUTO: 28.6 PG (ref 26–34)
MCHC RBC AUTO-ENTMCNC: 32.1 G/DL (ref 31–37)
MCV RBC AUTO: 88.9 FL
NONHDLC SERPL-MCNC: 154 MG/DL (ref ?–130)
OSMOLALITY SERPL CALC.SUM OF ELEC: 291 MOSM/KG (ref 275–295)
PLATELET # BLD AUTO: 165 10(3)UL (ref 150–450)
POTASSIUM SERPL-SCNC: 4.1 MMOL/L (ref 3.5–5.1)
PROT SERPL-MCNC: 7.6 G/DL (ref 6.4–8.2)
RBC # BLD AUTO: 4.97 X10(6)UL
SODIUM SERPL-SCNC: 140 MMOL/L (ref 136–145)
TRIGL SERPL-MCNC: 94 MG/DL (ref 30–149)
TSI SER-ACNC: 3.2 MIU/ML (ref 0.36–3.74)
VIT D+METAB SERPL-MCNC: 25.6 NG/ML (ref 30–100)
VLDLC SERPL CALC-MCNC: 17 MG/DL (ref 0–30)
WBC # BLD AUTO: 7.7 X10(3) UL (ref 4–11)

## 2023-07-31 PROCEDURE — 3008F BODY MASS INDEX DOCD: CPT | Performed by: INTERNAL MEDICINE

## 2023-07-31 PROCEDURE — 85027 COMPLETE CBC AUTOMATED: CPT | Performed by: INTERNAL MEDICINE

## 2023-07-31 PROCEDURE — 84443 ASSAY THYROID STIM HORMONE: CPT | Performed by: INTERNAL MEDICINE

## 2023-07-31 PROCEDURE — 82306 VITAMIN D 25 HYDROXY: CPT | Performed by: INTERNAL MEDICINE

## 2023-07-31 PROCEDURE — 36415 COLL VENOUS BLD VENIPUNCTURE: CPT | Performed by: INTERNAL MEDICINE

## 2023-07-31 PROCEDURE — 80061 LIPID PANEL: CPT | Performed by: INTERNAL MEDICINE

## 2023-07-31 PROCEDURE — 99396 PREV VISIT EST AGE 40-64: CPT | Performed by: INTERNAL MEDICINE

## 2023-07-31 PROCEDURE — 80053 COMPREHEN METABOLIC PANEL: CPT | Performed by: INTERNAL MEDICINE

## 2023-07-31 PROCEDURE — 83036 HEMOGLOBIN GLYCOSYLATED A1C: CPT | Performed by: INTERNAL MEDICINE

## 2023-07-31 PROCEDURE — 3078F DIAST BP <80 MM HG: CPT | Performed by: INTERNAL MEDICINE

## 2023-07-31 PROCEDURE — 3074F SYST BP LT 130 MM HG: CPT | Performed by: INTERNAL MEDICINE

## 2023-08-18 ENCOUNTER — E-VISIT (OUTPATIENT)
Dept: TELEHEALTH | Age: 48
End: 2023-08-18

## 2023-08-18 DIAGNOSIS — Z02.9 ADMINISTRATIVE ENCOUNTER: Primary | ICD-10-CM

## 2023-08-18 NOTE — PROGRESS NOTES
Refer to patient Questionnaire and responses. See MyChart messages. Patient stated that he was not able to have the Rapid Strep test performed at one of our Outpatient Labs which will restrict me from being able to prescribe an antibiotic for him. He plans to contact his PCP. E-visit cancelled with no charge.

## 2024-02-04 ENCOUNTER — HOSPITAL ENCOUNTER (OUTPATIENT)
Dept: CT IMAGING | Age: 49
Discharge: HOME | End: 2024-02-04
Attending: INTERNAL MEDICINE

## 2024-02-04 DIAGNOSIS — Z13.6 SCREENING, ISCHEMIC HEART DISEASE: ICD-10-CM

## 2024-04-25 NOTE — ASSESSMENT & PLAN NOTE
Excision of left pyogenic granuloma was done in the office today by Dr. Shira Peña without complications. Erythromycin was applied and pressure patch was applied to the left eye. Patient can remove the pressure patch when he gets home.   Patient should use [Family History Reviewed and Updated] : family history reviewed and updated

## 2024-05-02 ENCOUNTER — OFFICE VISIT (OUTPATIENT)
Dept: OPHTHALMOLOGY | Facility: CLINIC | Age: 49
End: 2024-05-02

## 2024-05-02 DIAGNOSIS — H00.14 CHALAZION LEFT UPPER EYELID: Primary | ICD-10-CM

## 2024-05-02 PROCEDURE — 99213 OFFICE O/P EST LOW 20 MIN: CPT | Performed by: OPHTHALMOLOGY

## 2024-05-02 NOTE — ASSESSMENT & PLAN NOTE
Recommend aggressive warm compresses; patient should use them 20-30 times per day.  Information on chalazia given.   Told patient that it is not big enough to excise at this time.  Discussed with patient that since is still in the early stages, it may be able to resolve on its own without excision. Will have patient call [facility] if chalazion worsens or does not resolve.  Refer to Dr. Johann Sifuentes for evaluation and treatment.    Information given.

## 2024-05-02 NOTE — PATIENT INSTRUCTIONS
Chalazion left upper eyelid  Recommend aggressive warm compresses; patient should use them 20-30 times per day.  Information on chalazia given.   Told patient that it is not big enough to excise at this time.  Discussed with patient that since is still in the early stages, it may be able to resolve on its own without excision. Will have patient call [facility] if chalazion worsens or does not resolve.  Refer to Dr. Johann Sifuentes for evaluation and treatment.    Information given.

## 2024-05-02 NOTE — PROGRESS NOTES
Jairon Joe is a 48 year old male.    HPI:     HPI    Pt. C/o since last week the left eye was tender on touch, but on 04/28/24 the left upper lid started to swell up with pain, small white opening and redness. The white opening resolved on 04/29/24 but other symptoms still the same.   Denies any changes in the vision.   States a history of styes and chalazion in the past few years.   Started doing the warm compresses 3-4 times a day since 04/28/24.   Using Refresh eye drops 2-3 times a day.     Last edited by Aliyah Vargas OT on 5/2/2024  3:28 PM.        Patient History:  Past Medical History:    Chalazion right lower eyelid x 2        Surgical History: Jairon Joe has a past surgical history that includes Excision of Chalazion, Single - OD - Right Eye (Right, 2/3/2016) (Temporally, RLL/ RJM); oral surgery; and Eyelid Surgery Procedure - OS - Left Eye (Left, 04/18/2019) (excision of pyogenic granuloma left lower palpebral conjunctiva - RJM).    Family History   Problem Relation Age of Onset    Hypertension Mother     Glaucoma Neg     Diabetes Neg     Macular degeneration Neg        Social History:   Social History     Socioeconomic History    Marital status: Single   Tobacco Use    Smoking status: Never     Passive exposure: Never    Smokeless tobacco: Never   Vaping Use    Vaping status: Never Used   Substance and Sexual Activity    Alcohol use: Yes     Comment: very rare    Drug use: No   Other Topics Concern    Pt has a pacemaker No    Pt has a defibrillator No    Reaction to local anesthetic No    Caffeine Concern Yes     Comment: coffee; 1 cup daily     Exercise No       Medications:  No current outpatient medications on file.       Allergies:  No Known Allergies    ROS:       PHYSICAL EXAM:     Base Eye Exam       Visual Acuity (Snellen - Linear)         Right Left    Dist sc 20/20 20/20    Near sc 20/30 20/25              Pupils         Pupils    Right PERRL    Left PERRL              Extraocular Movement          Right Left     Full Full                  Slit Lamp and Fundus Exam       Slit Lamp Exam         Right Left    Lids/Lashes Meibomian gland dysfunction Meibomian gland dysfunction, large chalazion upper lid lateral third, 3+ swelling LORRI    Conjunctiva/Sclera Normal Normal    Cornea Clear Clear    Anterior Chamber Deep and quiet Deep and quiet    Iris Normal Normal              Fundus Exam         Right Left    Disc Good rim Good rim    C/D Ratio 0.5 0.5                  Refraction       Wearing Rx    Does not wear glasses                    ASSESSMENT/PLAN:     Diagnoses and Plan:     Chalazion left upper eyelid  Recommend aggressive warm compresses; patient should use them 20-30 times per day.  Information on chalazia given.   Told patient that it is not big enough to excise at this time.  Discussed with patient that since is still in the early stages, it may be able to resolve on its own without excision. Will have patient call [facility] if chalazion worsens or does not resolve.  Refer to Dr. Johann Sifuentes for evaluation and treatment.    Information given.          No orders of the defined types were placed in this encounter.      Meds This Visit:  Requested Prescriptions      No prescriptions requested or ordered in this encounter        Follow up instructions:  Return if symptoms worsen or fail to improve.    5/2/2024  Scribed by: Wilmer Gil MD

## 2025-03-29 ENCOUNTER — TELEPHONE (OUTPATIENT)
Facility: LOCATION | Age: 50
End: 2025-03-29

## 2025-03-29 NOTE — TELEPHONE ENCOUNTER
Please relay to patient or Listed contact if unable to reach:  Vahid Joe is unfortunately no longer with our organization,  My name is Dr. Santana and I'm located in [facility] and assigned to his former patients thru [facility].   I am reaching out in regards to remind you that  you are due forAnnual Physical Exam, Establish care visit with New Physician, and Colorectal cancer screening: given your age of greater than 45 I highly recommend you get your colorectal cancer screening if you decline screening colonoscopy a non invasive option is a stool Cologuard test which is done every three years. For average risk patients. Please follow up in clinic and we can discuss which option is best for you. I strive for delivering High quality/value care to my patients. If you have any questions, please schedule follow up with me  If you have already had a colonoscopy or Cologuard recently completed, please send Fax the records to our office [phone] However if you have established care with another provider please call our office [phone] or send us a [employer] message and we will remove your name from our list to indicate you have a new provider and will gladly send records to them if requested. for more information,  A Video on how to collect the cologuard sample on youtube: titled: How to properly use Cologuard colon cancer at-home test kit https://www.[employer].com/watch?v=IyudkXuThhI    Please complete this within the next month as I value providing high value evidence based medical care and prevention and would like to go over the next steps needed In your wellness endeavor.     However if you have established care with another provider please call our office [phone] or send us a [employer] message and we will remove your name from our list to indicate you have a new provider and will gladly send records to them if requested.      Wish you all the best in your endeavor for  better health  -Dr. Robbie Santana MD, MPH

## 2025-03-31 ENCOUNTER — TELEPHONE (OUTPATIENT)
Facility: LOCATION | Age: 50
End: 2025-03-31

## (undated) NOTE — LETTER
AUTHORIZATION FOR SURGICAL OPERATION OR OTHER PROCEDURE    1. I hereby authorize Dr. Jose Doyle, and [facility] [facility] staff assigned to my case to perform the following operation and/or procedure at the [facility] [place], [facility]:    _______________________________________________________________________________________________    Cortisone injection of the left hallux  _______________________________________________________________________________________________    2. My physician has explained the nature and purpose of the operation or other procedure, possible alternative methods of treatment, the risks involved, and the possibility of complication to me. I acknowledge that no guarantee has been made as to the result that may be obtained. 3.  I recognize that, during the course of this operation, or other procedure, unforseen conditions may necessitate additional or different procedure than those listed above. I, therefore, further authorize and request that the above named physician, his/her physician assistants or designees perform such procedures as are, in his/her professional opinion, necessary and desirable. 4.  Any tissue or organs removed in the operation or other procedure may be disposed of by and at the discretion of the [facility] [facility] [place], [facility] and [facility]. 5.  I understand that in the event of a medical emergency, I will be transported by local paramedics to [facility] or other hospital emergency department. 6.  I certify that I have read and fully understand the above consent to operation and/or other procedure. 7.  I acknowledge that my physician has explained sedation/analgesia administration to me including the risks and benefits. I consent to the administration of sedation/analgesia as may be necessary or desirable in the judgement of my physician.     Witness signature: ___________________________________________________ Date:  ______/______/_____ Time:  ________ A. M.  P.M. Patient Name:  ______________________________________________________  (please print)      Patient signature:  ___________________________________________________             Relationship to Patient:           []  Parent    Responsible person                          []  Spouse  In case of minor or                    [] Other  _____________   Incompetent name:  __________________________________________________                               (please print)      _____________      Responsible person  In case of minor or  Incompetent signature:  _______________________________________________    Statement of Physician  My signature below affirms that prior to the time of the procedure, I have explained to the patient and/or his/her guardian, the risks and benefits involved in the proposed treatment and any reasonable alternative to the proposed treatment. I have also explained the risks and benefits involved in the refusal of the proposed treatment and have answered the patient's questions.                         Date:  ______/______/_______  Provider                      Signature:  __________________________________________________________       Time:  ___________ A.M    P.M.

## (undated) NOTE — LETTER
AUTHORIZATION FOR SURGICAL OPERATION OR OTHER PROCEDURE    1.  I hereby authorize Dr. Kateryna Ordoñez, and [facility] staff assigned to my case to perform the following operation and/or procedure at the [facility]:    ________________________ Patient Name:  ______________________________________________________  (please print)      Patient signature:  ___________________________________________________             Relationship to Patient:           []  Parent    Responsible person

## (undated) NOTE — ED AVS SNAPSHOT
[facility] Emergency Department  [address]   [place] [address]  Phone:  [phone]  Fax:  [phone]          Margo Salazar   MRN: [id number]    Department:  [facility] Emergency Department   Date of Visit:  6/25/2017 visiting [email or web address]    IF THERE IS ANY CHANGE OR WORSENING OF YOUR CONDITION, CALL YOUR PRIMARY CARE PHYSICIAN AT ONCE OR RETURN IMMEDIATELY TO THE EMERGENCY DEPARTMENT.     If you have been prescribed any medication(s), please fill your prescription